# Patient Record
Sex: FEMALE | Race: OTHER | NOT HISPANIC OR LATINO | URBAN - METROPOLITAN AREA
[De-identification: names, ages, dates, MRNs, and addresses within clinical notes are randomized per-mention and may not be internally consistent; named-entity substitution may affect disease eponyms.]

---

## 2021-09-28 ENCOUNTER — EMERGENCY (EMERGENCY)
Facility: HOSPITAL | Age: 61
LOS: 1 days | Discharge: HOME | End: 2021-09-28
Attending: EMERGENCY MEDICINE
Payer: COMMERCIAL

## 2021-09-28 VITALS
WEIGHT: 160.06 LBS | TEMPERATURE: 98 F | SYSTOLIC BLOOD PRESSURE: 116 MMHG | HEART RATE: 777 BPM | HEIGHT: 63 IN | DIASTOLIC BLOOD PRESSURE: 81 MMHG | OXYGEN SATURATION: 100 % | RESPIRATION RATE: 16 BRPM

## 2021-09-28 LAB
ALBUMIN SERPL ELPH-MCNC: 4.6 G/DL — SIGNIFICANT CHANGE UP (ref 3.3–5)
ALP SERPL-CCNC: 70 U/L — SIGNIFICANT CHANGE UP (ref 40–120)
ALT FLD-CCNC: 15 U/L — SIGNIFICANT CHANGE UP (ref 10–45)
ANION GAP SERPL CALC-SCNC: 17 MMOL/L — SIGNIFICANT CHANGE UP (ref 5–17)
AST SERPL-CCNC: 17 U/L — SIGNIFICANT CHANGE UP (ref 10–40)
BASE EXCESS BLDV CALC-SCNC: -2.3 MMOL/L — LOW (ref -2–2)
BASOPHILS # BLD AUTO: 0.04 K/UL — SIGNIFICANT CHANGE UP (ref 0–0.2)
BASOPHILS NFR BLD AUTO: 0.6 % — SIGNIFICANT CHANGE UP (ref 0–2)
BILIRUB SERPL-MCNC: 0.4 MG/DL — SIGNIFICANT CHANGE UP (ref 0.2–1.2)
BUN SERPL-MCNC: 14 MG/DL — SIGNIFICANT CHANGE UP (ref 7–23)
CA-I SERPL-SCNC: 1.1 MMOL/L — LOW (ref 1.15–1.33)
CALCIUM SERPL-MCNC: 8.9 MG/DL — SIGNIFICANT CHANGE UP (ref 8.4–10.5)
CHLORIDE BLDV-SCNC: 103 MMOL/L — SIGNIFICANT CHANGE UP (ref 96–108)
CHLORIDE SERPL-SCNC: 104 MMOL/L — SIGNIFICANT CHANGE UP (ref 96–108)
CO2 BLDV-SCNC: 24 MMOL/L — SIGNIFICANT CHANGE UP (ref 22–26)
CO2 SERPL-SCNC: 17 MMOL/L — LOW (ref 22–31)
CREAT SERPL-MCNC: 0.61 MG/DL — SIGNIFICANT CHANGE UP (ref 0.5–1.3)
EOSINOPHIL # BLD AUTO: 0.04 K/UL — SIGNIFICANT CHANGE UP (ref 0–0.5)
EOSINOPHIL NFR BLD AUTO: 0.6 % — SIGNIFICANT CHANGE UP (ref 0–6)
GAS PNL BLDV: 135 MMOL/L — LOW (ref 136–145)
GAS PNL BLDV: SIGNIFICANT CHANGE UP
GAS PNL BLDV: SIGNIFICANT CHANGE UP
GLUCOSE BLDV-MCNC: 107 MG/DL — HIGH (ref 70–99)
GLUCOSE SERPL-MCNC: 150 MG/DL — HIGH (ref 70–99)
HCO3 BLDV-SCNC: 22 MMOL/L — SIGNIFICANT CHANGE UP (ref 22–29)
HCT VFR BLD CALC: 37.1 % — SIGNIFICANT CHANGE UP (ref 34.5–45)
HCT VFR BLDA CALC: 37 % — SIGNIFICANT CHANGE UP (ref 34.5–46.5)
HGB BLD CALC-MCNC: 12.4 G/DL — SIGNIFICANT CHANGE UP (ref 11.7–16.1)
HGB BLD-MCNC: 12.8 G/DL — SIGNIFICANT CHANGE UP (ref 11.5–15.5)
IMM GRANULOCYTES NFR BLD AUTO: 0.9 % — SIGNIFICANT CHANGE UP (ref 0–1.5)
LACTATE BLDV-MCNC: 1.6 MMOL/L — SIGNIFICANT CHANGE UP (ref 0.7–2)
LIDOCAIN IGE QN: 27 U/L — SIGNIFICANT CHANGE UP (ref 7–60)
LYMPHOCYTES # BLD AUTO: 2.22 K/UL — SIGNIFICANT CHANGE UP (ref 1–3.3)
LYMPHOCYTES # BLD AUTO: 34.1 % — SIGNIFICANT CHANGE UP (ref 13–44)
MCHC RBC-ENTMCNC: 29.6 PG — SIGNIFICANT CHANGE UP (ref 27–34)
MCHC RBC-ENTMCNC: 34.5 GM/DL — SIGNIFICANT CHANGE UP (ref 32–36)
MCV RBC AUTO: 85.7 FL — SIGNIFICANT CHANGE UP (ref 80–100)
MONOCYTES # BLD AUTO: 0.27 K/UL — SIGNIFICANT CHANGE UP (ref 0–0.9)
MONOCYTES NFR BLD AUTO: 4.1 % — SIGNIFICANT CHANGE UP (ref 2–14)
NEUTROPHILS # BLD AUTO: 3.88 K/UL — SIGNIFICANT CHANGE UP (ref 1.8–7.4)
NEUTROPHILS NFR BLD AUTO: 59.7 % — SIGNIFICANT CHANGE UP (ref 43–77)
NRBC # BLD: 0 /100 WBCS — SIGNIFICANT CHANGE UP (ref 0–0)
PCO2 BLDV: 38 MMHG — LOW (ref 39–42)
PH BLDV: 7.38 — SIGNIFICANT CHANGE UP (ref 7.32–7.43)
PLATELET # BLD AUTO: 288 K/UL — SIGNIFICANT CHANGE UP (ref 150–400)
PO2 BLDV: 41 MMHG — SIGNIFICANT CHANGE UP (ref 25–45)
POTASSIUM BLDV-SCNC: 3.7 MMOL/L — SIGNIFICANT CHANGE UP (ref 3.5–5.1)
POTASSIUM SERPL-MCNC: 3.3 MMOL/L — LOW (ref 3.5–5.3)
POTASSIUM SERPL-SCNC: 3.3 MMOL/L — LOW (ref 3.5–5.3)
PROT SERPL-MCNC: 7.4 G/DL — SIGNIFICANT CHANGE UP (ref 6–8.3)
RBC # BLD: 4.33 M/UL — SIGNIFICANT CHANGE UP (ref 3.8–5.2)
RBC # FLD: 11.9 % — SIGNIFICANT CHANGE UP (ref 10.3–14.5)
SAO2 % BLDV: 73.3 % — SIGNIFICANT CHANGE UP (ref 67–88)
SARS-COV-2 RNA SPEC QL NAA+PROBE: SIGNIFICANT CHANGE UP
SODIUM SERPL-SCNC: 138 MMOL/L — SIGNIFICANT CHANGE UP (ref 135–145)
WBC # BLD: 6.51 K/UL — SIGNIFICANT CHANGE UP (ref 3.8–10.5)
WBC # FLD AUTO: 6.51 K/UL — SIGNIFICANT CHANGE UP (ref 3.8–10.5)

## 2021-09-28 PROCEDURE — 93010 ELECTROCARDIOGRAM REPORT: CPT

## 2021-09-28 PROCEDURE — 99220: CPT

## 2021-09-28 PROCEDURE — 99053 MED SERV 10PM-8AM 24 HR FAC: CPT

## 2021-09-28 RX ORDER — METOCLOPRAMIDE HCL 10 MG
10 TABLET ORAL ONCE
Refills: 0 | Status: COMPLETED | OUTPATIENT
Start: 2021-09-28 | End: 2021-09-29

## 2021-09-28 RX ORDER — LIDOCAINE 4 G/100G
1 CREAM TOPICAL ONCE
Refills: 0 | Status: COMPLETED | OUTPATIENT
Start: 2021-09-28 | End: 2021-09-28

## 2021-09-28 RX ORDER — ACETAMINOPHEN 500 MG
650 TABLET ORAL ONCE
Refills: 0 | Status: COMPLETED | OUTPATIENT
Start: 2021-09-28 | End: 2021-09-28

## 2021-09-28 RX ORDER — ACETAMINOPHEN 500 MG
1000 TABLET ORAL ONCE
Refills: 0 | Status: COMPLETED | OUTPATIENT
Start: 2021-09-28 | End: 2021-09-28

## 2021-09-28 RX ORDER — MECLIZINE HCL 12.5 MG
50 TABLET ORAL ONCE
Refills: 0 | Status: COMPLETED | OUTPATIENT
Start: 2021-09-28 | End: 2021-09-28

## 2021-09-28 RX ORDER — ONDANSETRON 8 MG/1
4 TABLET, FILM COATED ORAL ONCE
Refills: 0 | Status: COMPLETED | OUTPATIENT
Start: 2021-09-28 | End: 2021-09-28

## 2021-09-28 RX ORDER — SODIUM CHLORIDE 9 MG/ML
1000 INJECTION, SOLUTION INTRAVENOUS
Refills: 0 | Status: DISCONTINUED | OUTPATIENT
Start: 2021-09-28 | End: 2021-10-01

## 2021-09-28 RX ORDER — MECLIZINE HCL 12.5 MG
25 TABLET ORAL ONCE
Refills: 0 | Status: COMPLETED | OUTPATIENT
Start: 2021-09-28 | End: 2021-09-28

## 2021-09-28 RX ORDER — SODIUM CHLORIDE 9 MG/ML
1000 INJECTION, SOLUTION INTRAVENOUS ONCE
Refills: 0 | Status: COMPLETED | OUTPATIENT
Start: 2021-09-28 | End: 2021-09-28

## 2021-09-28 RX ORDER — POTASSIUM CHLORIDE 20 MEQ
40 PACKET (EA) ORAL DAILY
Refills: 0 | Status: DISCONTINUED | OUTPATIENT
Start: 2021-09-28 | End: 2021-10-01

## 2021-09-28 RX ORDER — FAMOTIDINE 10 MG/ML
20 INJECTION INTRAVENOUS ONCE
Refills: 0 | Status: COMPLETED | OUTPATIENT
Start: 2021-09-28 | End: 2021-09-28

## 2021-09-28 RX ADMIN — Medication 650 MILLIGRAM(S): at 18:57

## 2021-09-28 RX ADMIN — Medication 1 MILLIGRAM(S): at 11:19

## 2021-09-28 RX ADMIN — FAMOTIDINE 20 MILLIGRAM(S): 10 INJECTION INTRAVENOUS at 07:14

## 2021-09-28 RX ADMIN — ONDANSETRON 4 MILLIGRAM(S): 8 TABLET, FILM COATED ORAL at 06:59

## 2021-09-28 RX ADMIN — SODIUM CHLORIDE 1000 MILLILITER(S): 9 INJECTION, SOLUTION INTRAVENOUS at 07:59

## 2021-09-28 RX ADMIN — LIDOCAINE 1 PATCH: 4 CREAM TOPICAL at 22:00

## 2021-09-28 RX ADMIN — Medication 25 MILLIGRAM(S): at 11:19

## 2021-09-28 RX ADMIN — Medication 40 MILLIEQUIVALENT(S): at 08:15

## 2021-09-28 RX ADMIN — Medication 400 MILLIGRAM(S): at 08:14

## 2021-09-28 RX ADMIN — SODIUM CHLORIDE 150 MILLILITER(S): 9 INJECTION, SOLUTION INTRAVENOUS at 15:38

## 2021-09-28 RX ADMIN — LIDOCAINE 1 PATCH: 4 CREAM TOPICAL at 19:21

## 2021-09-28 RX ADMIN — SODIUM CHLORIDE 1000 MILLILITER(S): 9 INJECTION, SOLUTION INTRAVENOUS at 06:58

## 2021-09-28 RX ADMIN — Medication 1000 MILLIGRAM(S): at 09:00

## 2021-09-28 RX ADMIN — LIDOCAINE 1 PATCH: 4 CREAM TOPICAL at 10:02

## 2021-09-28 RX ADMIN — Medication 1000 MILLIGRAM(S): at 08:30

## 2021-09-28 RX ADMIN — Medication 50 MILLIGRAM(S): at 08:14

## 2021-09-28 NOTE — ED CDU PROVIDER DISPOSITION NOTE - ATTENDING CONTRIBUTION TO CARE
CDU Attending Note -- Pt seen and examined at bedside.  Case discussed c CDU PA.  Pt comfortable, asymptomatic, and has good follow up.  Stated near resolution of dizziness and on exam no FND, CN 2-12 intact, strength/sensation full x 4 extremities, gait is normal.  Verbal recs from neurology consulting resident yesterday recommended MRI brain without contrast, which was unremarkable.  At this time there is no further work-up or treatment required to necessitate further CDU monitoring or admission.  Strict return precautions provided to patient.  Will discharge c rx for meclizine.  --BMM

## 2021-09-28 NOTE — ED CDU PROVIDER INITIAL DAY NOTE - OBJECTIVE STATEMENT
61yo F w/ no pmh presenting with N/V/D. This morning at 2am she woke up, felt dizzy as if room was spinning, then vomited many times which was not bloody. She also had 3 bouts of diarrhea. She still currently feels the room spinning with position changes, has nausea/vomiting, and headache. She ate sushi last night with her family who do not have similar symptoms. She was vaccinated for COVID. No fever, chills, chest pain, sob, abdominal pain, focal weakness, numbness or tingling. Pt does not drink alchohol.  In the ED, pts VSS and in NAD. Labs significant for K 3.3 which was repleted and a lactate of 2.1-improved to 1.6 after 1L LR. Pt was tx w/ meclizine, zofran, ativan and IVF w/ improvement in sxs, now able to ambulate with minimal assist but still experiencing persistent dizziness. Plan to place in CDU for symptomatic relief, observation and neuro consult. Spoke with neuro, recommending MRI brain and will consult on pt while in CDU. Further recs to follow. Case d/w Dr. Monzon

## 2021-09-28 NOTE — ED CDU PROVIDER DISPOSITION NOTE - PATIENT PORTAL LINK FT
You can access the FollowMyHealth Patient Portal offered by [facility] by registering at the following website: [email or web address]. By joining [facility]’s FollowMyHealth portal, you will also be able to view your health information using other applications (apps) compatible with our system.

## 2021-09-28 NOTE — ED ADULT NURSE NOTE - ED STAT RN HANDOFF DETAILS
hand off received from night nurse Gustavo Cristina RN. IV fluid bolus in progress LACF. Pt's son at [facility]. Fall risk precautions maintained. Pt c/o nausea and dizziness. Color sallow skin W&D.

## 2021-09-28 NOTE — ED PROVIDER NOTE - OBJECTIVE STATEMENT
61yo F w/ no pmh presenting with N/V/D. This morning at 2am she woke up, felt dizzy as if room was spinning, then vomited many times which was not bloody. She also had 3 bouts of diarrhea. She still currently feels the room spinning with position changes, has nausea/vomiting, and headache. She ate sushi last night with her family who do not have similar symptoms. She was vaccinated for COVID. No fever, chills, chest pain, sob, abdominal pain, focal weakness, numbness or tingling. Pt does not drink alchohol.

## 2021-09-28 NOTE — ED ADULT NURSE NOTE - OBJECTIVE STATEMENT
pt is a 60 year old female, aox3, full code, presents to the ED complaining of abd pain, nausea and vomiting since 2am today, per pt she ate sushi last night for dinner and went to bed feeling fine and woke up at 2am and vomited and had diarrhea, pt states she was unable to hold down any liquids or solid food which prompted her to call EMS and come to the ED, pt given 4mg of IV zofran by EMS en route, pt with rebound tenderness and generalized abd pain

## 2021-09-28 NOTE — ED CDU PROVIDER INITIAL DAY NOTE - PROGRESS NOTE DETAILS
Spoke with neuro to clarify recommendations in their consult note  They are recommending MRI brain at this time and will continue to follow  Shyam León PA-C CDU PROGRESS NOTE RICCI SPIVEY: Received pt at 1900 sign-out. Case/plan reviewed. VSS. Pt resting in stretcher in NAD. Pt reports having mild headache, 3/10 in severity, declines analgesia now. Pt denies nausea, vomiting, numbness, weakness or dizziness now. Pt is aox3, speaking coherently, Neuro exam + Mild R sided horizontal nystagmus otherwise neuro intact. Will continue to monitor overnight, MRI brain pending and Neurology following.

## 2021-09-28 NOTE — ED PROVIDER NOTE - PHYSICAL EXAMINATION
General appearance: appears uncomfortable   Eyes: anicteric sclerae, AVELINA, EOMI   HENT: Atraumatic; oropharynx clear   Neck: Trachea midline; Full range of motion, supple   Pulm: CTA bl, normal respiratory effort and no intercostal retractions, normal work of breathing   CV: RRR, No murmurs, rubs, or gallops. 2+ peripheral pulses.   Abdomen: Mildly tender in epigastric area, soft, non-distended; no guarding or rebound   Extremities: No peripheral edema or extremity lymphadenopathy.    Skin: Dry, normal temperature, turgor and texture; no rash, ulcers or subcutaneous nodules   Psych: Appropriate affect, cooperative; alert and oriented to person, place and time

## 2021-09-28 NOTE — ED PROVIDER NOTE - PROGRESS NOTE DETAILS
Lori Horner- anup feels that nausea, dizziness has improved with fluids/meds Lori Horner- pt feels that nausea, vertigo has improved with fluids/meds Lori Horner- pt felt vertigo and nausea again after going to bathroom, was not able to stand up well. Gave additional meds, feels better now. Was able to sit up, will reassess.

## 2021-09-28 NOTE — ED CDU PROVIDER DISPOSITION NOTE - CARE PROVIDER_API CALL
Rosa M Zarate)  Neurology  General  [address] [place] [address]  Phone: [phone]  Fax: [phone]  Follow Up Time:

## 2021-09-28 NOTE — CONSULT NOTE ADULT - ASSESSMENT
GERARDO SLATER is a 60y ([birth year]) Female PMHx migraines presenting 09-28-21 Emergency with CC N/V and dizziness worsened with head movement. This morning at 2am she woke up, felt dizzy as if room was spinning, then vomited many times which was not bloody. She also had 3 bouts of diarrhea. She still currently feels the room spinning with position changes, has nausea/vomiting, and headache. Improved with meclizine. CTH without acute abnormality.    Impression   N/V and dizziness worsened with head movement, improved with meclizine 2/2 peripheral vertigo    Plan  - no further imaging recommended  - symptom management (meclizine, ondansetron, fluids)  - Patient can follow up with general neurology at [address] [place] [address] 1-2 weeks after discharge. Please instruct the patient to call [phone] to schedule this appointment.       Attending Attestation to Follow.  GERARDO SLATER is a 60y ([birth year]) Female PMHx migraines presenting 09-28-21 Emergency with CC N/V and dizziness worsened with head movement. This morning at 2am she woke up, felt dizzy as if room was spinning, then vomited many times which was not bloody. She also had 3 bouts of diarrhea. She still currently feels the room spinning with position changes, has nausea/vomiting, and headache. Improved with meclizine.    Impression   N/V and dizziness worsened with head movement, improved with meclizine 2/2 peripheral vertigo    Plan  - no further imaging recommended  - symptom management (meclizine, ondansetron, fluids)  - Patient can follow up with general neurology at [address] [place] [address] 1-2 weeks after discharge. Please instruct the patient to call [phone] to schedule this appointment.       Attending Attestation to Follow.

## 2021-09-28 NOTE — ED PROVIDER NOTE - NSFOLLOWUPINSTRUCTIONS_ED_ALL_ED_FT
Please follow up with your primary care physician.    Please return to the Emergency Department if you experience worsening symptoms including increased nausea/vomiting, chest pain, shortness of breath, abdominal pain, inability to eat or drink, blood in your vomit or stool, fainting or increased headache. See your Primary Doctor and Neurology (rapid referral) this week for follow up -- call to discuss.    Stay well hydrated, eat/drink smaller amounts more frequently, get plenty of rest.    Use MECLIZINE (dizziness) and ZOFRAN ODT (nausea) as directed -- see medication warnings.    See VERTIGO and VOMITING & DIARRHEA information and return instructions given to you.    Seek immediate medical care for new/worsening symptoms/concerns.

## 2021-09-28 NOTE — CONSULT NOTE ADULT - NSCONSULTADDITIONALINFOA_GEN_ALL_CORE
MRI reviewed and negative. Case discussed with neurology resident. Agree with assessment and plan. Follow up as otpt.

## 2021-09-28 NOTE — ED ADULT NURSE NOTE - ED STAT RN HANDOFF DETAILS 2
hand off given to CDU BRIAN Ponce. Pt feels better. A&Ox4. States dizziness, HA and nausea resolved. See neuro assessment sheet. Tolerated po crackers and apple juice without vomiting. Nystagmus noted during pupil check, less than earlier. Fall risk precautions maintained.

## 2021-09-28 NOTE — ED CDU PROVIDER INITIAL DAY NOTE - PHYSICAL EXAMINATION
CONSTITUTIONAL: Well appearing and in no apparent distress.  ENT: Airway patent, moist mucous membranes.   EYES: Pupils equal, round and reactive to light. EOMI. Conjunctiva normal appearing. Mild R sided horizontal nystagmus.  CARDIAC: Normal rate, regular rhythm.  Heart sounds S1, S2.  No murmurs, rubs or gallops.  RESPIRATORY: Breath sounds clear and equal bilaterally. No wheezing, rales or rhonchi.   GASTROINTESTINAL: Abdomen soft, non-tender, not distended and no guarding.   MUSCULOSKELETAL: Spine appears normal, no muscle or joint tenderness. No LE edema.  NEUROLOGICAL: Alert and oriented, no focal deficits, no motor or sensory deficits. 5/5 muscle strength throughout.  Negative pronator drift, normal finger to nose. Sensation intact. Speech clear. No facial asymmetry.   SKIN: Skin normal color, warm, dry and intact. No evidence of rash.  PSYCHIATRIC: Alert and oriented to person, place, time/situation. normal mood and affect.

## 2021-09-28 NOTE — CONSULT NOTE ADULT - SUBJECTIVE AND OBJECTIVE BOX
NEUROLOGY DEPT. INPATIENT CONSULT  Consult Information:   [facility] page [id number] ([id number]) before calling spectra [id number] unless code, urgent, transfer  [facility] page [id number] ([id number]) before calling spectra [id number] unless code, urgent, transfer      HISTORY OF PRESENT ILLNESS:    GERARDO SLATER is a 60y ([birth year]) Female PMHx migraines presenting 09-28-21 Emergency with CC N/V and dizziness worsened with head movement. This morning at 2am she woke up, felt dizzy as if room was spinning, then vomited many times which was not bloody. She also had 3 bouts of diarrhea. She still currently feels the room spinning with position changes, has nausea/vomiting, and headache. She ate sushi last night with her family who do not have similar symptoms. She was vaccinated for COVID. No fever, chills, chest pain, sob, abdominal pain, focal weakness, numbness or tingling. Pt does not drink alchohol.     By evaluation of neurological team, pt endorses she was already improving after meclizine.     REVIEW OF SYMPTOMS  A 10-point ROS was completed and negative    PAST MEDICAL & SURGICAL HISTORY:   migraine       No significant past surgical history        HOME MEDICATIONS:       EMERGENCY DEPARTMENT:  acetaminophen  IVPB ..   400 mL/Hr IV Intermittent (09-28 @ 08:14)    famotidine Injectable   20 milliGRAM(s) IV Push (09-28 @ 07:14)    lactated ringers Bolus   1000 mL/Hr IV Bolus (09-28 @ 06:58)    lactated ringers.   150 mL/Hr IV Continuous (09-28 @ 07:10)    lidocaine   4% Patch   1 Patch Transdermal (09-28 @ 10:02)    LORazepam   Injectable   1 milliGRAM(s) IV Push (09-28 @ 11:19)    meclizine   25 milliGRAM(s) Oral (09-28 @ 11:19)    meclizine   50 milliGRAM(s) Oral (09-28 @ 08:14)    ondansetron Injectable   4 milliGRAM(s) IV Push (09-28 @ 06:59)    potassium chloride    Tablet ER   40 milliEquivalent(s) Oral (09-28 @ 08:15)        ALLERGIES:       OBJECTIVE DATA  Vital Signs Last 24 Hrs  T(C): 36.6 (28 Sep 2021 15:45), Max: 97.9 (28 Sep 2021 15:35)  T(F): 97.9 (28 Sep 2021 15:45), Max: 208.2 (28 Sep 2021 15:35)  HR: 85 (28 Sep 2021 15:45) (67 - 777)  BP: 129/111 (28 Sep 2021 15:45) (97/70 - 129/111)  BP(mean): 79 (28 Sep 2021 15:43) (79 - 79)  RR: 18 (28 Sep 2021 15:45) (16 - 20)  SpO2: 99% (28 Sep 2021 15:45) (99% - 100%)  I&O's Summary      PHYSICAL EXAM:  Constitutional- laying in  bed NAD  Pulm- on RA, comfortable RR  heent - no ear pain on palpation or visible erythema   NEUROLOGIC  - Mental Status:  AAOx3; speech is fluent with intact naming, and comprehension  - Cranial Nerves II-XII:    II:  PERRLA; visual fields are full to confrontation  III, IV, VI:  EOMI, no nystagmus  V:  facial sensation is intact in the V1-V3 distribution bilaterally.  VII:  face is symmetric with normal eye closure and smile  VIII:  hearing is intact to finger rub  IX, X:  uvula is midline and soft palate rises symmetrically  XI:  head turning and shoulder shrug are intact bilaterally  XII:  tongue protrudes in the midline  - Motor:  strength is 5/5 throughout; normal muscle bulk and tone throughout; no pronator drift  - Coordination:  finger-nose-finger and heel-knee-shin intact without dysmetria; rapid alternating hand movements intact  HINTS negative     ROUTINE STUDIES:  CAPILLARY BLOOD GLUCOSE      POCT Blood Glucose.: 152 mg/dL (28 Sep 2021 06:57)                          12.8   6.51  )-----------( 288      ( 28 Sep 2021 07:04 )             37.1     09-28    138  |  104  |  14  ----------------------------<  150<H>  3.3<L>   |  17<L>  |  0.61    Ca    8.9      28 Sep 2021 07:04  Mg     2.1     09-28    TPro  7.4  /  Alb  4.6  /  TBili  0.4  /  DBili  x   /  AST  17  /  ALT  15  /  AlkPhos  70  09-28                NEUROIMAGING:

## 2021-09-28 NOTE — ED PROVIDER NOTE - ATTENDING CONTRIBUTION TO CARE
------------ATTENDING NOTE------------  pt c/o waking up with nausea, small amts nbnb vomiting, loose nb stools, mild ache/cramps in epigastrium, no fevers, describes eating sushi last evening, mild distress 2nd retching, no chest pain/discomfort or sob/dyspnea, mild epigastric tenderness, pending labs/imaging and close reassessments -->  - Ananth Monzon MD   --------------------------------------------- ------------ATTENDING NOTE------------  pt w/ son c/o waking up with nausea, small amts nbnb vomiting, loose nb stools, mild ache/cramps in epigastrium, no fevers, describes eating sushi last evening, mild distress 2nd retching, no chest pain/discomfort or sob/dyspnea, mild epigastric tenderness, pending labs/imaging and close reassessments -->  - Ananth Monzon MD   --------------------------------------------- ------------ATTENDING NOTE------------  pt w/ son c/o waking up with nausea, small amts nbnb vomiting, loose nb stools, mild ache/cramps in epigastrium, no fevers, describes eating sushi last evening, mild distress 2nd retching, no chest pain/discomfort or sob/dyspnea, mild epigastric tenderness, c/o sudden onset room-spinning vertigo when turning head/moving, associated n/v, improves w/ closing eyes/standing still, pending labs/imaging and close reassessments -->  - Ananth Monzon MD   --------------------------------------------- ------------ATTENDING NOTE------------  pt w/ son c/o waking up with nausea, small amts nbnb vomiting, loose nb stools, mild ache/cramps in epigastrium, no fevers, describes eating sushi last evening, mild distress 2nd retching, no chest pain/discomfort or sob/dyspnea, mild epigastric tenderness, c/o sudden onset room-spinning vertigo when turning head/moving, associated n/v, improves w/ closing eyes/standing still, pending labs/imaging and close reassessments --> (9AM) significantly improved, tolerating PO, nml neuro exam w/ resolved vertigo, close reassessments, nml VS, in depth dw all about ddx, tx, lundy, continued close outpt fu.  - Ananth Monzon MD   --------------------------------------------- ------------ATTENDING NOTE------------  pt w/ son c/o waking up with nausea, small amts nbnb vomiting, loose nb stools, mild ache/cramps in epigastrium, no fevers, describes eating sushi last evening, mild distress 2nd retching, no chest pain/discomfort or sob/dyspnea, mild epigastric tenderness, c/o sudden onset room-spinning vertigo when turning head/moving, associated n/v, improves w/ closing eyes/standing still, pending labs/imaging and close reassessments --> (9AM) significantly improved, tolerating PO, nml neuro exam w/ resolved vertigo, close reassessments, nml VS, in depth dw all about ddx, tx, lundy, continued close outpt fu --> (9:45) pt walked in bathroom and felt vertigo / nausea, plan for CDU for IVF, advance diet, Neuro consult.  - Ananth Monzon MD   ---------------------------------------------

## 2021-09-28 NOTE — ED CDU PROVIDER DISPOSITION NOTE - NSFOLLOWUPINSTRUCTIONS_ED_ALL_ED_FT
You were seen and evaluated for dizziness while in the ER. Your symptoms are likely due to vertigo. Additional information regarding diagnosis can be found below.  Please make sure to follow up with the neurologist in the office. Information for follow up is as follows:  Rosa M Zarate)  Neurology  General  [address] [place] [id number]  Phone: [phone]  Fax: [phone]    Vertigo  BPPV is an inner ear condition that causes you to suddenly feel dizzy. Benign means it is not serious or life-threatening. BPPV is caused by a problem with the nerves and structure of your inner ear. BPPV happens when small pieces of calcium break loose and lump together in one of your inner ear canals.     Seek care immediately if:   •You fall during a BPPV episode and are injured.  •You have a severe headache that does not go away.  •You have new changes in your vision or feel weak or confused.  •You have problems hearing, or you have ringing or buzzing in your ears.      Contact your healthcare provider if:   •Your BPPV symptoms do not go away or they return.  •You have problems with your balance, or you are falling often.  •You have new or increased nausea or vomiting with vertigo.  •You feel anxious or depressed and do not want to leave your home.  •You have questions or concerns about your condition or care.      Prevent your symptoms:   •Try to avoid sudden head movements. Stand up and lie down slowly.   •Raise and support your head when you lie down. Place pillows under your upper back and head or rest in a recliner.   •Change your position often when you are lying down. Try not to lie with your head on the same side for long periods of time. Roll over slowly.   •Wear protective gear when you ride a bike or play sports. A helmet helps protect your head from injury. You were seen and evaluated for dizziness while in the ER. Your symptoms are likely due to vertigo. Additional information regarding diagnosis can be found below.  Please make sure to follow up with the neurologist in the office. Follow up at  [address] [place] [address] 1-2 weeks after discharge. Please call [phone] to schedule this appointment.     1. Rest, stay hydrated.    2. Take Meclizine 12.5 mg every 8 hours as needed for dizziness.     3. Take Zofran 4 mg every 8 hours as needed for nausea. If you do not feel dizzy or nauseous you do not need to take these medications.    4. Return to the Emergency Department immediately if you develop any new/worsening symptoms including weakness, severe headache, vomiting,     ___________________________________________________________________________________________________________________________  Vertigo (BPPV)  BPPV is an inner ear condition that causes you to suddenly feel dizzy. Benign means it is not serious or life-threatening. BPPV is caused by a problem with the nerves and structure of your inner ear. BPPV happens when small pieces of calcium break loose and lump together in one of your inner ear canals.     Seek care immediately if:   •You fall during a BPPV episode and are injured.  •You have a severe headache that does not go away.  •You have new changes in your vision or feel weak or confused.  •You have problems hearing, or you have ringing or buzzing in your ears.      Contact your healthcare provider if:   •Your BPPV symptoms do not go away or they return.  •You have problems with your balance, or you are falling often.  •You have new or increased nausea or vomiting with vertigo.  •You feel anxious or depressed and do not want to leave your home.  •You have questions or concerns about your condition or care.      Prevent your symptoms:   •Try to avoid sudden head movements. Stand up and lie down slowly.   •Raise and support your head when you lie down. Place pillows under your upper back and head or rest in a recliner.   •Change your position often when you are lying down. Try not to lie with your head on the same side for long periods of time. Roll over slowly.   •Wear protective gear when you ride a bike or play sports. A helmet helps protect your head from injury.

## 2021-09-28 NOTE — ED CDU PROVIDER DISPOSITION NOTE - CLINICAL COURSE
61yo F w/ no pmh presenting with N/V/D. This morning at 2am she woke up, felt dizzy as if room was spinning, then vomited many times which was not bloody. She also had 3 bouts of diarrhea. She still currently feels the room spinning with position changes, has nausea/vomiting, and headache. She ate sushi last night with her family who do not have similar symptoms. She was vaccinated for COVID. No fever, chills, chest pain, sob, abdominal pain, focal weakness, numbness or tingling. Pt does not drink alchohol.  In the ED, pts VSS and in NAD. Labs significant for K 3.3 which was repleted and a lactate of 2.1-improved to 1.6 after 1L LR. Pt was tx w/ meclizine, zofran, ativan and IVF w/ improvement in sxs, now able to ambulate with minimal assist but still experiencing persistent dizziness. Plan to place in CDU for symptomatic relief, observation and neuro consult. Spoke with neuro, recommending MRI brain and will consult on pt while in CDU. Further recs to follow. Case d/w Dr. Monzon  While in the CDU ________________________________ 59yo F w/ no pmh presenting with N/V/D. This morning at 2am she woke up, felt dizzy as if room was spinning, then vomited many times which was not bloody. She also had 3 bouts of diarrhea. She still currently feels the room spinning with position changes, has nausea/vomiting, and headache. She ate sushi last night with her family who do not have similar symptoms. She was vaccinated for COVID. No fever, chills, chest pain, sob, abdominal pain, focal weakness, numbness or tingling. Pt does not drink alchohol.  In the ED, pts VSS and in NAD. Labs significant for K 3.3 which was repleted and a lactate of 2.1-improved to 1.6 after 1L LR. Pt was tx w/ meclizine, zofran, ativan and IVF w/ improvement in sxs, now able to ambulate with minimal assist but still experiencing persistent dizziness. Plan to place in CDU for symptomatic relief, observation and neuro consult. Spoke with neuro, recommending MRI brain and will consult on pt while in CDU. Further recs to follow. Case d/w Dr. Monzon  While in the CDU patient had improvement of symptoms. MRI resulted, no mass effect, acute cerebral ischemia or evidence of acute intracranial pathology. Neurology cleared patient for discharge and outpatient f/u. Case d/w ED attending Dr. Maxwell who evaluated patient and cleared her for discharge home w/ rx for meclizine and zofran. and outpt neuro follow up. Patient stable at discharge.

## 2021-09-29 VITALS
SYSTOLIC BLOOD PRESSURE: 104 MMHG | TEMPERATURE: 97 F | RESPIRATION RATE: 18 BRPM | HEART RATE: 88 BPM | DIASTOLIC BLOOD PRESSURE: 74 MMHG | OXYGEN SATURATION: 98 %

## 2021-09-29 LAB
A1C WITH ESTIMATED AVERAGE GLUCOSE RESULT: 5.3 % — SIGNIFICANT CHANGE UP (ref 4–5.6)
CHOLEST SERPL-MCNC: 166 MG/DL — SIGNIFICANT CHANGE UP
ESTIMATED AVERAGE GLUCOSE: 105 MG/DL — SIGNIFICANT CHANGE UP (ref 68–114)
HDLC SERPL-MCNC: 52 MG/DL — SIGNIFICANT CHANGE UP
LIPID PNL WITH DIRECT LDL SERPL: 100 MG/DL — HIGH
NON HDL CHOLESTEROL: 113 MG/DL — SIGNIFICANT CHANGE UP
TRIGL SERPL-MCNC: 66 MG/DL — SIGNIFICANT CHANGE UP

## 2021-09-29 PROCEDURE — 80053 COMPREHEN METABOLIC PANEL: CPT

## 2021-09-29 PROCEDURE — 82947 ASSAY GLUCOSE BLOOD QUANT: CPT

## 2021-09-29 PROCEDURE — 83690 ASSAY OF LIPASE: CPT

## 2021-09-29 PROCEDURE — 82330 ASSAY OF CALCIUM: CPT

## 2021-09-29 PROCEDURE — 82803 BLOOD GASES ANY COMBINATION: CPT

## 2021-09-29 PROCEDURE — 85014 HEMATOCRIT: CPT

## 2021-09-29 PROCEDURE — 70551 MRI BRAIN STEM W/O DYE: CPT | Mod: MG

## 2021-09-29 PROCEDURE — 80061 LIPID PANEL: CPT

## 2021-09-29 PROCEDURE — 84295 ASSAY OF SERUM SODIUM: CPT

## 2021-09-29 PROCEDURE — 99284 EMERGENCY DEPT VISIT MOD MDM: CPT | Mod: 25

## 2021-09-29 PROCEDURE — 83036 HEMOGLOBIN GLYCOSYLATED A1C: CPT

## 2021-09-29 PROCEDURE — G0378: CPT

## 2021-09-29 PROCEDURE — 96361 HYDRATE IV INFUSION ADD-ON: CPT

## 2021-09-29 PROCEDURE — 84145 PROCALCITONIN (PCT): CPT

## 2021-09-29 PROCEDURE — 96374 THER/PROPH/DIAG INJ IV PUSH: CPT

## 2021-09-29 PROCEDURE — 82435 ASSAY OF BLOOD CHLORIDE: CPT

## 2021-09-29 PROCEDURE — 85018 HEMOGLOBIN: CPT

## 2021-09-29 PROCEDURE — 83735 ASSAY OF MAGNESIUM: CPT

## 2021-09-29 PROCEDURE — 83605 ASSAY OF LACTIC ACID: CPT

## 2021-09-29 PROCEDURE — 70551 MRI BRAIN STEM W/O DYE: CPT | Mod: 26,MG

## 2021-09-29 PROCEDURE — 99217: CPT

## 2021-09-29 PROCEDURE — 85025 COMPLETE CBC W/AUTO DIFF WBC: CPT

## 2021-09-29 PROCEDURE — G1004: CPT

## 2021-09-29 PROCEDURE — 84132 ASSAY OF SERUM POTASSIUM: CPT

## 2021-09-29 PROCEDURE — 93005 ELECTROCARDIOGRAM TRACING: CPT

## 2021-09-29 PROCEDURE — 82962 GLUCOSE BLOOD TEST: CPT

## 2021-09-29 PROCEDURE — U0003: CPT

## 2021-09-29 PROCEDURE — 96375 TX/PRO/DX INJ NEW DRUG ADDON: CPT

## 2021-09-29 PROCEDURE — U0005: CPT

## 2021-09-29 RX ORDER — ACETAMINOPHEN 500 MG
650 TABLET ORAL ONCE
Refills: 0 | Status: COMPLETED | OUTPATIENT
Start: 2021-09-29 | End: 2021-09-29

## 2021-09-29 RX ORDER — ONDANSETRON 8 MG/1
1 TABLET, FILM COATED ORAL
Qty: 6 | Refills: 0
Start: 2021-09-29 | End: 2021-09-30

## 2021-09-29 RX ORDER — MECLIZINE HCL 12.5 MG
1 TABLET ORAL
Qty: 9 | Refills: 0
Start: 2021-09-29 | End: 2021-10-01

## 2021-09-29 RX ADMIN — Medication 10 MILLIGRAM(S): at 00:01

## 2021-09-29 RX ADMIN — Medication 650 MILLIGRAM(S): at 07:23

## 2021-09-29 RX ADMIN — SODIUM CHLORIDE 150 MILLILITER(S): 9 INJECTION, SOLUTION INTRAVENOUS at 22:32

## 2021-09-29 NOTE — ED CDU PROVIDER SUBSEQUENT DAY NOTE - HISTORY
CDU PROGRESS NOTE RICCI SPIVEY: Pt resting comfortably, feeling well without complaint. NAD, VSS. No events on telemetry. Pt is aox3, speaking coherently, no focal neuro deficits. Will continue to monitor, MRI pending.

## 2021-09-29 NOTE — ED ADULT NURSE REASSESSMENT NOTE - NS ED NURSE REASSESS COMMENT FT1
07.00 Am Received the Pt from  BRIAN Johnson . Pt is Observed for Vertigo for MRI . Received the Pt A&OX 4 obeys commands Tyesha N/V/D fever chills cp SOB   Comfort care & safety measures continued  IV site looks clean & dry no signs of infiltration noted pt denies  pain IV site .  Pt is advised to call for help  call bell with in the reach pt verbalized the understanding . Pt states  she has headche 4/10  Medicated as per order  pending CDU  MD atkinson . GCS 15/15 A&OX 4 PERRLA  size 3 Strong upper & lower extremities steady gait walks ambulatory without support    No facial droop  No Hand Leg drop denies numbness tingling Continue to monitor  08.30 Pt is evaluated by CDU MD June Perry  . pt is feeling better.  Pt is discharged . Ml jose d Flores   explained the follow up care & gave the discharge summary  . Pt has stable vitals steady gait A&OX 4 at the time of Discharge
0955 Feels better than earlier. Difficulty walking. To be evaluated by CDU PA for monitoring.
15.00  Received the Pt from  BRIAN Appiah . Pt is Observed for Vertigo  . Received the Pt A&OX 4 obeys commands Tyesha N/V/D fever chills cp SOB   Comfort care & safety measures continued  IV site looks clean & dry no signs of infiltration noted pt denies  pain IV site .  Pt is advised to call for help  call bell with in the reach pt verbalized the understanding . Pt states  she feels better & her Vertigo is improving  pending CDU  MD atkinson . GCS 15/15 A&OX 4 PERRLA  size 3 Strong upper & lower extremities steady gait . Pt ambulated to the bathroom   No facial droop  No Hand Leg drop denies numbness tingling Continue to monitor
Pt AO4. Neuro check intact. No deficits noted. Pt medicated for headache. Safety maintained. Will continue to monitor.
Pt AO4. Neuro check intact. No deficits noted. Safety maintained. Will continue to monitor.
Pt received from BRIAN Ojeda. Pt oriented to CDU & plan of care was discussed. Pt AO4. Neuro check intact. No deficits noted. Pt endorses 2/10 headache which pt states has been intermittent. Pt denies any dizziness at the moment and is aware to notify RN or PA of any symptoms prior to ambulation. Safety & comfort measures maintained. Call bell in reach. Will continue to monitor.

## 2021-09-29 NOTE — ED CDU PROVIDER SUBSEQUENT DAY NOTE - PHYSICAL EXAMINATION
CONSTITUTIONAL: Well appearing and in no apparent distress.  ENT: Airway patent, moist mucous membranes.   EYES: Pupils equal, round and reactive to light. EOMI. Conjunctiva normal appearing.   CARDIAC: Normal rate, regular rhythm.  Heart sounds S1, S2.  No murmurs, rubs or gallops.  RESPIRATORY: Breath sounds clear and equal bilaterally. No wheezing, rales or rhonchi.   GASTROINTESTINAL: Abdomen soft, non-tender, not distended and no guarding.   MUSCULOSKELETAL: Spine appears normal, no muscle or joint tenderness. No LE edema.  NEUROLOGICAL: Alert and oriented, no focal deficits, no motor or sensory deficits. 5/5 muscle strength throughout.  Negative pronator drift, normal finger to nose. Sensation intact. Speech clear. No facial asymmetry.   SKIN: Skin normal color, warm, dry and intact. No evidence of rash.  PSYCHIATRIC: Alert and oriented to person, place, time/situation. normal mood and affect.

## 2021-09-29 NOTE — ED CDU PROVIDER SUBSEQUENT DAY NOTE - PROGRESS NOTE DETAILS
Patient seen at bedside in NAD.  VSS.  Patient resting comfortably without complaints. Feels improved from yesterday. MRI resulted, no mass effect, acute cerebral ischemia or evidence of acute intracranial pathology. Will d/w neuro regarding final recs and dispo. Will continue to monitor. - Christiano Bangura PA-C Neurology resident Ludwig called CDU, states patient was cleared from their standpoint for discharge yesterday, again clarified conversation that was had with neurology resident yesterday as per CDU initial day note documentation that neuro verbally recommended MRI yesterday which was confirmed twice and that their initial note denoted CTH unremarkable (which pt never received). He states neuro will update their note accordingly, aware of MRI results and cleared patient for d/c. - Christiano Bangura PA-C Pt evaluated by ED attending  Dr. Maxwell who cleared patient for discharge home. Gave copy of and went over all results with patient at bedside. Discussed importance of PMD f/u in next 1-2 days, neurology follow up (gave clinic but pt states she's from [place] and will f/u there, meclizine rx and advised on strict return precautions. Stable for d/c. - EMILY HoffmanC

## 2023-12-15 NOTE — ED ADULT TRIAGE NOTE - BP NONINVASIVE SYSTOLIC (MM HG)
Ambulatory Care Coordination Note  12/15/2023      ACM attempted to contact the patient by telephone. Message left on voice mail requesting return call. Writer's contact information provided. Method of communication with provider: staff message. ACM: Fartun Ramirez RN    CC Plan:        Review medications with Patient     2. Review upcoming appointments with Patient    Future Appointments   Date Time Provider [address] [place] [address]   12/21/2023 11:00 AM Rodolfosubhash [facility]ins [facility]   1/5/2024 11:30 AM Yasmine Barker, [address]     3. Review RPM readings:  BP:  137/94; HR:  65 & 67; P.O.:  95%; weight:  191.2 lb      4. Patient got MUGA scan done on 11/21/23. Patient kept appointment with Dr. Corby Hyatt to review results on 12/13/23. His note was reviewed. Patient needs a cath prior to ICD and Nephrology clearance. 5.  Patient wears a Life Vest    Offered patient enrollment in the Remote Patient Monitoring (RPM) program for in-home monitoring: Yes, patient already enrolled.     Lab Results       None            Care Coordination Interventions    Referral from Primary Care Provider: No  Suggested Interventions and Community Resources  Diabetes Education: Completed  Fall Risk Prevention: Declined  Disease Specific Clinic: Completed (Comment: Dr. Maria T Muse, Vascular;  Dr. Jenn Scott, Nephrology;)  Registered Dietician: Completed (Comment: Tio Najera RD, [facility])  Specialty Services Referral: Completed (Comment: Patient is currently wearing a Life Vest)  Transportation Support: Declined  Zone Management Tools: Completed          Goals Addressed    None         Future Appointments   Date Time Provider [address]   12/21/2023 11:00 AM Rodolfo Goins [facility]   1/5/2024 11:30 AM Jayce, [address]   2/7/2024 11:30 AM Sam Hare MD [facility] None   3/21/2024 11:45 AM [facility]
